# Patient Record
Sex: MALE | Race: WHITE | Employment: OTHER | URBAN - METROPOLITAN AREA
[De-identification: names, ages, dates, MRNs, and addresses within clinical notes are randomized per-mention and may not be internally consistent; named-entity substitution may affect disease eponyms.]

---

## 2017-10-23 ENCOUNTER — IMPORTED ENCOUNTER (OUTPATIENT)
Dept: URBAN - METROPOLITAN AREA CLINIC 50 | Facility: CLINIC | Age: 62
End: 2017-10-23

## 2017-10-30 ENCOUNTER — IMPORTED ENCOUNTER (OUTPATIENT)
Dept: URBAN - METROPOLITAN AREA CLINIC 50 | Facility: CLINIC | Age: 62
End: 2017-10-30

## 2020-01-20 ENCOUNTER — IMPORTED ENCOUNTER (OUTPATIENT)
Dept: URBAN - METROPOLITAN AREA CLINIC 50 | Facility: CLINIC | Age: 65
End: 2020-01-20

## 2020-01-20 NOTE — PATIENT DISCUSSION
"""Dry eyes OU seen on todays exam."" ""Start Artificial tears both eyes two - four times a day . """

## 2020-07-30 ENCOUNTER — IMPORTED ENCOUNTER (OUTPATIENT)
Dept: URBAN - METROPOLITAN AREA CLINIC 50 | Facility: CLINIC | Age: 65
End: 2020-07-30

## 2020-08-03 ENCOUNTER — IMPORTED ENCOUNTER (OUTPATIENT)
Dept: URBAN - METROPOLITAN AREA CLINIC 50 | Facility: CLINIC | Age: 65
End: 2020-08-03

## 2020-08-04 ENCOUNTER — IMPORTED ENCOUNTER (OUTPATIENT)
Dept: URBAN - METROPOLITAN AREA CLINIC 50 | Facility: CLINIC | Age: 65
End: 2020-08-04

## 2020-08-13 ENCOUNTER — IMPORTED ENCOUNTER (OUTPATIENT)
Dept: URBAN - METROPOLITAN AREA CLINIC 50 | Facility: CLINIC | Age: 65
End: 2020-08-13

## 2020-08-18 ENCOUNTER — IMPORTED ENCOUNTER (OUTPATIENT)
Dept: URBAN - METROPOLITAN AREA CLINIC 50 | Facility: CLINIC | Age: 65
End: 2020-08-18

## 2020-08-18 NOTE — PATIENT DISCUSSION
"""S/P IOL OD: Sensar AAB00 21.50 (Target: Distance) +Femto/Arcs +Omidria. Continue post operative instructions and drops per schedule.  """

## 2020-08-27 ENCOUNTER — IMPORTED ENCOUNTER (OUTPATIENT)
Dept: URBAN - METROPOLITAN AREA CLINIC 50 | Facility: CLINIC | Age: 65
End: 2020-08-27

## 2020-08-27 NOTE — PATIENT DISCUSSION
"""S/P IOL OD: Sensar AAB00 21.50 (Target: Distance) +Femto/Arcs +Omidria.  Continue post operative instructions and drops per schedule. "" ""Continue Pred-Moxi-Nepaf right eye three times a day

## 2020-09-01 ENCOUNTER — IMPORTED ENCOUNTER (OUTPATIENT)
Dept: URBAN - METROPOLITAN AREA CLINIC 50 | Facility: CLINIC | Age: 65
End: 2020-09-01

## 2020-09-10 ENCOUNTER — IMPORTED ENCOUNTER (OUTPATIENT)
Dept: URBAN - METROPOLITAN AREA CLINIC 50 | Facility: CLINIC | Age: 65
End: 2020-09-10

## 2020-10-01 ENCOUNTER — IMPORTED ENCOUNTER (OUTPATIENT)
Dept: URBAN - METROPOLITAN AREA CLINIC 50 | Facility: CLINIC | Age: 65
End: 2020-10-01

## 2020-10-01 NOTE — PATIENT DISCUSSION
"""S/P IOL OU: OD: Sensar AAB00 21.50 (Target: Distance)Femto/ArcsOmidria. OS: Sensar AAB00 22.00/Arcs. "

## 2021-01-07 ENCOUNTER — IMPORTED ENCOUNTER (OUTPATIENT)
Dept: URBAN - METROPOLITAN AREA CLINIC 50 | Facility: CLINIC | Age: 66
End: 2021-01-07

## 2021-01-07 NOTE — PATIENT DISCUSSION
"""Dry eye seen on exam today in office. "" ""Continue Artificial tears both eyes two - four times a day . """

## 2021-05-15 ASSESSMENT — TONOMETRY
OD_IOP_MMHG: 13
OD_IOP_MMHG: 15
OD_IOP_MMHG: 16
OS_IOP_MMHG: 14
OS_IOP_MMHG: 16
OS_IOP_MMHG: 26
OS_IOP_MMHG: 16
OS_IOP_MMHG: 16
OD_IOP_MMHG: 14
OS_IOP_MMHG: 16
OS_IOP_MMHG: 14
OD_IOP_MMHG: 16
OD_IOP_MMHG: 16
OS_IOP_MMHG: 14
OD_IOP_MMHG: 16
OD_IOP_MMHG: 20
OS_IOP_MMHG: 14
OD_IOP_MMHG: 16

## 2021-05-15 ASSESSMENT — VISUAL ACUITY
OS_CC: 20/40
OD_SC: 20/20-
OD_BAT: 20/25
OD_SC: 20/30-
OD_CC: J1@ 13 IN
OD_BAT: 20/40
OD_OTHER: 20/40. 20/60-.
OS_OTHER: 20/30-. 20/40-.
OS_PH: 20/30
OS_BAT: 20/30
OD_OTHER: 20/50. 20/60-.
OD_CC: 20/30
OD_CC: 20/30
OS_OTHER: 20/40. 20/40.
OD_SC: 20/20
OS_SC: 20/50
OD_BAT: 20/40
OS_PH: 20/30-1
OD_SC: 20/20
OD_CC: J1@ 16 IN
OS_BAT: 20/30
OS_BAT: 20/40
OD_OTHER: 20/25. 20/30.
OS_CC: 20/60-1
OS_CC: J1@ 16 IN
OS_CC: J1@ 13 IN
OS_OTHER: 20/30. 20/40.
OS_CC: 20/30
OS_BAT: 20/40
OD_BAT: 20/50
OS_OTHER: 20/30. 20/40-2.
OD_BAT: 20/40
OS_BAT: 20/30-
OS_OTHER: 20/40. 20/60.
OD_OTHER: 20/40. 20/60.
OS_SC: 20/20
OD_CC: 20/30
OS_CC: J1
OS_SC: 20/30+1
OS_CC: 20/40
OD_CC: 20/30+
OS_CC: 20/25
OD_CC: J2LAP
OD_SC: 20/20
OD_CC: J1
OD_OTHER: 20/40. 20/60.
OD_BAT: 20/40
OS_SC: 20/30
OS_OTHER: 20/40. 20/40.
OS_BAT: 20/40
OS_CC: J2LAP
OD_PH: 20/30+2
OD_OTHER: 20/40. 20/60.

## 2021-12-28 ENCOUNTER — PREPPED CHART (OUTPATIENT)
Dept: URBAN - METROPOLITAN AREA CLINIC 53 | Facility: CLINIC | Age: 66
End: 2021-12-28

## 2021-12-28 NOTE — PATIENT DISCUSSION
"""Dry eye seen on exam today in office. "" ""Continue Artificial tears both eyes two - four times a day . ""."

## 2023-06-07 ENCOUNTER — COMPREHENSIVE EXAM (OUTPATIENT)
Dept: URBAN - METROPOLITAN AREA CLINIC 53 | Facility: CLINIC | Age: 68
End: 2023-06-07

## 2023-06-07 DIAGNOSIS — H52.4: ICD-10-CM

## 2023-06-07 DIAGNOSIS — H04.123: ICD-10-CM

## 2023-06-07 DIAGNOSIS — H35.373: ICD-10-CM

## 2023-06-07 DIAGNOSIS — H26.493: ICD-10-CM

## 2023-06-07 DIAGNOSIS — H18.451: ICD-10-CM

## 2023-06-07 PROCEDURE — 92012 INTRM OPH EXAM EST PATIENT: CPT

## 2023-06-07 PROCEDURE — 92134 CPTRZ OPH DX IMG PST SGM RTA: CPT

## 2023-06-07 PROCEDURE — 92015 DETERMINE REFRACTIVE STATE: CPT

## 2023-06-07 ASSESSMENT — VISUAL ACUITY
OD_GLARE: 20/25
OS_GLARE: 20/25
OS_SC: 20/30
OU_CC: J1+@14"
OS_GLARE: 20/25
OD_SC: 20/30
OD_GLARE: 20/25
OU_SC: 20/20-1

## 2023-06-07 ASSESSMENT — TONOMETRY
OS_IOP_MMHG: 13
OD_IOP_MMHG: 13